# Patient Record
Sex: FEMALE | Race: WHITE | NOT HISPANIC OR LATINO | Employment: OTHER | URBAN - METROPOLITAN AREA
[De-identification: names, ages, dates, MRNs, and addresses within clinical notes are randomized per-mention and may not be internally consistent; named-entity substitution may affect disease eponyms.]

---

## 2017-02-07 ENCOUNTER — HOSPITAL ENCOUNTER (OUTPATIENT)
Dept: RADIATION ONCOLOGY | Facility: MEDICAL CENTER | Age: 82
End: 2017-02-28
Attending: RADIOLOGY
Payer: MEDICARE

## 2017-02-07 PROCEDURE — 99213 OFFICE O/P EST LOW 20 MIN: CPT | Performed by: RADIOLOGY

## 2017-02-07 PROCEDURE — 99212 OFFICE O/P EST SF 10 MIN: CPT | Performed by: RADIOLOGY

## 2017-02-08 NOTE — PROCEDURES
DATE OF SERVICE:  02/07/2017    REFERRING PHYSICIAN:  Dr. Aleman at [facility].    OTHER PHYSICIANS:  Dr. Ludivina Chen and Dr. Ricardo Estevez.    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  She is actually doing very well.  She is   able to eat soft foods, and she has 3 cans of Ensure a day.  She still has   dry mouth and loss of taste and some swallowing difficulties, she does have   the soft palate deficit, but she does have a prosthesis that she uses, but all   in all, she is feeling good and essentially without any complaints.    ROS: Is significant for urinary incontinence, dry skin, arthritis, thyroid disease, s/p thyroidectomy, dizziness with positional changes. The rest of the ROS is negative and is in the EMR    PHYSICAL EXAMINATION:  VITAL SIGNS:  Today, her weight is 113.5, temperature 98.2, pulse 64, blood   pressure 121/75, and O2 sat 97%.  HEENT:  Normocephalic and atraumatic.  Sclerae are anicteric.  Extraocular   movements are intact.  Ears and nose within normal limits.  Mouth:  She has   lower denture and upper prosthesis.  The prosthesis was removed.  She does   have telangiectatic changes in the soft palate and hard palate region.  The   rim of the resection cavity is smooth, flat without any nodularity or mass.    There is no visible or palpable tumor in the mouth or oropharynx.  NECK:  She has no evidence of lymphadenopathy in the neck or supraclavicular   fossa.  The left neck has postoperative changes and postradiation changes with   some induration.  No axillary adenopathy.  HEART:  Regular rate.  LUNGS:  Clear.  ABDOMEN:  Soft.  EXTREMITIES:  Without clubbing, cyanosis, or edema.  NEUROLOGIC:  Nonfocal, does use a cane to walk.    IMPRESSION:  Clinically, no evidence of disease.    PLAN:  I would like to see her back in 6 months and then another  6 months, at   which time she will be at 3 years, I think that is a reasonable followup since   she is at this point 86.       ____________________________________     MD ENEDINA REYNOLDS / ANIBAL    DD:  02/07/2017 16:38:18  DT:  02/07/2017 17:02:41    D#:  [id number]  Job#:  [id number]

## 2017-08-08 ENCOUNTER — HOSPITAL ENCOUNTER (OUTPATIENT)
Dept: RADIATION ONCOLOGY | Facility: MEDICAL CENTER | Age: 82
End: 2017-08-31
Attending: RADIOLOGY
Payer: MEDICARE

## 2017-08-08 VITALS
HEIGHT: 61 IN | DIASTOLIC BLOOD PRESSURE: 68 MMHG | TEMPERATURE: 98.6 F | SYSTOLIC BLOOD PRESSURE: 136 MMHG | BODY MASS INDEX: 20.88 KG/M2 | OXYGEN SATURATION: 95 % | HEART RATE: 75 BPM | WEIGHT: 110.6 LBS

## 2017-08-08 PROCEDURE — 99212 OFFICE O/P EST SF 10 MIN: CPT | Mod: 25 | Performed by: RADIOLOGY

## 2017-08-08 PROCEDURE — 31575 DIAGNOSTIC LARYNGOSCOPY: CPT | Performed by: RADIOLOGY

## 2017-08-08 PROCEDURE — 99213 OFFICE O/P EST LOW 20 MIN: CPT | Mod: 25 | Performed by: RADIOLOGY

## 2017-08-08 ASSESSMENT — PAIN SCALES - GENERAL: PAINLEVEL: NO PAIN

## 2017-08-08 NOTE — NON-PROVIDER
"Patient was seen today in clinic with Dr. Cook for Follow up.  Vitals signs and weight were obtained and pain assessment was completed.  Allergies and medications were reviewed with the patient.  Review of systems completed.     Vitals/Pain:  Filed Vitals:    08/08/17 1349   BP: 136/68   Pulse: 75   Temp: 37 °C (98.6 °F)   Height: 1.549 m (5' 0.98\")   Weight: 50.168 kg (110 lb 9.6 oz)   SpO2: 95%   Pain Score: No pain        Allergies:   Pcn and Sulfa drugs    Current Medications:  Current Outpatient Prescriptions   Medication Sig Dispense Refill   • naproxen (NAPROSYN) 500 MG Tab Take 500 mg by mouth every morning with breakfast.     • levothyroxine (SYNTHROID) 75 MCG TABS Take 75 mcg by mouth Every morning on an empty stomach.       No current facility-administered medications for this encounter.       Review of Systems:  ROS Reviewed and scanned     PCP:  Tyrell Horton, Med Ass't  8/8/2017  1:51 PM      "

## 2018-06-26 ENCOUNTER — HOSPITAL ENCOUNTER (OUTPATIENT)
Dept: RADIATION ONCOLOGY | Facility: MEDICAL CENTER | Age: 83
End: 2018-06-26
Attending: RADIOLOGY
Payer: MEDICARE

## 2018-06-26 VITALS
BODY MASS INDEX: 20.98 KG/M2 | DIASTOLIC BLOOD PRESSURE: 63 MMHG | TEMPERATURE: 99.5 F | SYSTOLIC BLOOD PRESSURE: 148 MMHG | OXYGEN SATURATION: 95 % | WEIGHT: 111 LBS | HEART RATE: 68 BPM

## 2018-06-26 PROCEDURE — 31575 DIAGNOSTIC LARYNGOSCOPY: CPT | Performed by: RADIOLOGY

## 2018-06-26 PROCEDURE — 99215 OFFICE O/P EST HI 40 MIN: CPT | Mod: 25 | Performed by: RADIOLOGY

## 2018-06-26 PROCEDURE — 99212 OFFICE O/P EST SF 10 MIN: CPT | Performed by: RADIOLOGY

## 2018-06-26 RX ORDER — PILOCARPINE HYDROCHLORIDE 5 MG/1
5 TABLET, FILM COATED ORAL 3 TIMES DAILY
COMMUNITY

## 2018-06-26 ASSESSMENT — PAIN SCALES - GENERAL: PAINLEVEL: NO PAIN

## 2018-06-26 NOTE — NON-PROVIDER
Patient was seen today in clinic with Dr. Cook for follow up for lump under left ear and whole in the upper gum line where she received treatment in 2015.  Vitals signs and weight were obtained and pain assessment was completed.  Allergies and medications were reviewed with the patient.  Review of systems completed.     Vitals/Pain:  Vitals:    06/26/18 1447   BP: 148/63   Pulse: 68   Temp: 37.5 °C (99.5 °F)   SpO2: 95%   Weight: 50.3 kg (111 lb)   Pain Score: No pain        Allergies:   Pcn [penicillins] and Sulfa drugs    Current Medications:  Current Outpatient Prescriptions   Medication Sig Dispense Refill   • pilocarpine (SALAGEN) 5 MG Tab Take 5 mg by mouth 3 times a day.     • naproxen (NAPROSYN) 500 MG Tab Take 500 mg by mouth every morning with breakfast.     • levothyroxine (SYNTHROID) 75 MCG TABS Take 75 mcg by mouth Every morning on an empty stomach.       No current facility-administered medications for this encounter.          PCP:  Tyrell Horton, Med Ass't

## 2018-06-26 NOTE — PROGRESS NOTES
RADIATION ONCOLOGY FOLLOW-UP    DATE OF SERVICE: 6/26/2018    IDENTIFICATION:   A 87 y.o. 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   .      HISTORY OF PRESENT ILLNESS:   Since last seen patient has been doing well but she pointed out to her son that she felt like she noticed a little lump at the angle of her mandible on the left. She was also seen by her dental technician who felt that in the resected area on the hard palate on the left there was a new 'hole'. Patient is still complaining of a dry mouth and has trouble gaining weight. Otherwise she is doing remarkably well    CURRENT MEDICATIONS:  Current Outpatient Prescriptions   Medication Sig Dispense Refill   • pilocarpine (SALAGEN) 5 MG Tab Take 5 mg by mouth 3 times a day.     • naproxen (NAPROSYN) 500 MG Tab Take 500 mg by mouth every morning with breakfast.     • levothyroxine (SYNTHROID) 75 MCG TABS Take 75 mcg by mouth Every morning on an empty stomach.       No current facility-administered medications for this encounter.        ALLERGIES:  Pcn [penicillins] and Sulfa drugs    FAMILY HISTORY:    No family history on file.        SOCIAL HISTORY:     reports that she has never smoked. She does not have any smokeless tobacco history on file. She reports that she drinks alcohol. She reports that she does not use drugs.    REVIEW OF SYSTEMS: Is significant for dry mouth and decrease appetite because of this and so has some weight loss. She has some fatigue and she is on thyroid medication. She has difficulty swallowing dry mouth occasional nosebleeds and dental problems. She has hearing loss from a prior infection. She has occasional constipation and occasional urinary incontinence which has improved recently. She has a lot of knee pain and pain in her neck and stiffness.  The rest of the review of systems has been reviewed by me  and is documented in the nursing note in [facility] dated 6/26/2018    PHYSICAL EXAM:    Vitals:    06/26/18 1447   BP: 148/63   Pulse: 68   Temp: 37.5 °C (99.5 °F)   SpO2: 95%   Weight: 50.3 kg (111 lb)   Pain Score: No pain        ECOG PERFORMANCE STATUS:  2= Ambulatory and capable of all self care, but unable to carry out any work activities.  Up and about more than 50% of waking hours.  GENERAL: Well-appearing alert and oriented ×3 in no apparent distress  HEENT:  Pupils are equal, round, and reactive to light.  Extraocular muscles   are intact. Sclerae nonicteric.  Conjunctivae pink. Evidence of the prior resection of the left hard palate. The area appears smooth and flat I do not see any nodularity. The hole that was described I believe just could be atrophy from the prior radiation and surgery.   NECK:  Supple without evidence of thyromegaly.  NODES:  No peripheral adenopathy of the neck, supraclavicular fossa or axillae   bilaterally. She does have a small area of fullness or scarring at the angle of the mandible on the left measures about 5 mm in greatest dimension. She says it is tender  LUNGS:  Clear to ascultation   HEART:  Regular rate and rhythm.  No murmur appreciated  ABDOMEN:  Soft. No evidence of hepatosplenomegaly.  Positive bowel sounds.  EXTREMITIES:  Without Edema.  NEUROLOGIC:  Cranial nerves II through XII were intact. Normal stance and gait motor and sensory grossly within normal limits        IMPRESSION:    A 87 y.o. with T3 N2b ,stage BE,P 16 negative squamous cell carcinoma of the left soft palate status post complete resection and completion of radiation therapy 4/22/2015.   essentially clinically and ED but concerned about scarring or mass in the angle of the mandible on the left measuring about 5 mm and also concerned about a development of a new hole in the resected area of the left hard palate.    RECOMMENDATIONS:   Spoke with Dr. Muse and we are going to have her take a look at the  areas. I reassured the patient that I actually don't think they are tumor recurrence. I'm happy to see her at any time or in a year.      Thank you for the opportunity to participate in her care.  If any questions or comments, please do not hesitate in calling.      Please note that this dictation was created using voice recognition software. I have made every reasonable attempt to correct obvious errors, but I expect that there are errors of grammar and possibly content that I did not discover before finalizing the note.
